# Patient Record
(demographics unavailable — no encounter records)

---

## 2025-05-12 NOTE — HEALTH RISK ASSESSMENT
[Good] : ~his/her~  mood as  good [No] : No [Patient reported PAP Smear was normal] : Patient reported PAP Smear was normal [None] : None [With Family] : lives with family [Employed] : employed [] :  [# Of Children ___] : has [unfilled] children [Feels Safe at Home] : Feels safe at home [Fully functional (bathing, dressing, toileting, transferring, walking, feeding)] : Fully functional (bathing, dressing, toileting, transferring, walking, feeding) [Fully functional (using the telephone, shopping, preparing meals, housekeeping, doing laundry, using] : Fully functional and needs no help or supervision to perform IADLs (using the telephone, shopping, preparing meals, housekeeping, doing laundry, using transportation, managing medications and managing finances) [Reports changes in hearing] : Reports no changes in hearing [Reports changes in vision] : Reports no changes in vision [Reports changes in dental health] : Reports no changes in dental health [PapSmearDate] : 03/25 [Never] : Never

## 2025-05-12 NOTE — HISTORY OF PRESENT ILLNESS
[de-identified] : 37 yr old female with rheumatoid arthritis here to establish care and for CPE. She feels well, RA was diagnosed a year ago, sees Dr Tiffanie Reyes from [facility]. Concerned about weight gain and wishes to start medications for weight loss.

## 2025-05-12 NOTE — PLAN
[FreeTextEntry1] : Check labs Stressed diet and exercise. Counseled risks and side effects of GLP1RA. Discussed side effects of nausea, constipation, pancreatitis. No personal or FH of thyroid cancer or MEN2. Counseled that she will need to follow up with GYN for birth control. Recommend that medication, if approved, will need to be discontinued 3 months prior to trying to conceive. Will consider Zepbound, pending labs and insurance approval. BMI 31.64. Will need to follow up in 3 months.